# Patient Record
Sex: MALE | Race: WHITE | NOT HISPANIC OR LATINO | Employment: FULL TIME | URBAN - METROPOLITAN AREA
[De-identification: names, ages, dates, MRNs, and addresses within clinical notes are randomized per-mention and may not be internally consistent; named-entity substitution may affect disease eponyms.]

---

## 2017-02-22 ENCOUNTER — OFFICE VISIT (OUTPATIENT)
Dept: FAMILY MEDICINE | Facility: CLINIC | Age: 42
End: 2017-02-22
Payer: COMMERCIAL

## 2017-02-22 VITALS
SYSTOLIC BLOOD PRESSURE: 120 MMHG | WEIGHT: 122.44 LBS | HEART RATE: 58 BPM | HEIGHT: 66 IN | BODY MASS INDEX: 19.68 KG/M2 | DIASTOLIC BLOOD PRESSURE: 78 MMHG | OXYGEN SATURATION: 99 % | TEMPERATURE: 98 F

## 2017-02-22 DIAGNOSIS — Z00.00 ROUTINE PHYSICAL EXAMINATION: Primary | ICD-10-CM

## 2017-02-22 PROCEDURE — 99396 PREV VISIT EST AGE 40-64: CPT | Mod: S$GLB,,, | Performed by: FAMILY MEDICINE

## 2017-02-22 PROCEDURE — 99999 PR PBB SHADOW E&M-EST. PATIENT-LVL III: CPT | Mod: PBBFAC,,, | Performed by: FAMILY MEDICINE

## 2017-02-22 NOTE — PROGRESS NOTES
[facility]  Progress Note    SUBJECTIVE:     Chief Complaint   Patient presents with    Annual Exam       HPI   Rajinder Morris  is a 41 y.o. male here for annual exam. Patient has no other new complaints/problems at this time.      Review of patient's allergies indicates:  No Known Allergies    History reviewed. No pertinent past medical history.  Past Surgical History   Procedure Laterality Date    Leg surgery       right leg     History reviewed. No pertinent family history.  Social History   Substance Use Topics    Smoking status: Current Some Day Smoker     Types: Cigars    Smokeless tobacco: None    Alcohol use No        Review of Systems   Constitutional: Negative for chills, diaphoresis and fever.   HENT: Negative for congestion, ear pain and sore throat.    Eyes: Negative for photophobia and discharge.   Respiratory: Negative for cough, shortness of breath and wheezing.    Cardiovascular: Negative for chest pain and palpitations.   Gastrointestinal: Negative for abdominal pain, constipation, diarrhea, nausea and vomiting.   Genitourinary: Negative for dysuria and hematuria.   Musculoskeletal: Negative for back pain and myalgias.   Skin: Negative for itching and rash.   Neurological: Negative for dizziness, sensory change, focal weakness, weakness and headaches.   All other systems reviewed and are negative.    OBJECTIVE:     Vitals:    02/22/17 1237   BP: 120/78   Pulse: (!) 58   Temp: 98.4 °F (36.9 °C)     Body mass index is 19.77 kg/(m^2).    Physical Exam   Constitutional: He is oriented to person, place, and time and well-developed, well-nourished, and in no distress. No distress.   HENT:   Head: Normocephalic and atraumatic.   Right Ear: Tympanic membrane is not perforated, not erythematous and not bulging. No hemotympanum.   Left Ear: Tympanic membrane is not perforated, not erythematous and not bulging. No hemotympanum.   Mouth/Throat: Oropharynx is clear and moist. No oropharyngeal  exudate.   Eyes: Conjunctivae and EOM are normal. Pupils are equal, round, and reactive to light.   Neck: No thyromegaly present.   Cardiovascular: Normal rate, regular rhythm and normal heart sounds.  Exam reveals no gallop and no friction rub.    No murmur heard.  Pulmonary/Chest: Effort normal and breath sounds normal. No respiratory distress. He has no wheezes. He has no rales.   Abdominal: Soft. Bowel sounds are normal. He exhibits no distension. There is no tenderness. There is no rebound and no guarding.   Musculoskeletal: Normal range of motion. He exhibits no edema or tenderness.   Lymphadenopathy:     He has no cervical adenopathy.   Neurological: He is alert and oriented to person, place, and time.   Skin: Skin is warm. No rash noted. He is not diaphoretic. No erythema.       Old records were reviewed. Labs and/or images were independently reviewed.    ASSESSMENT     1. Routine physical examination        PLAN:     Routine physical examination  -     CBC auto differential; Future  -     Comprehensive metabolic panel; Future  -     Hemoglobin A1c; Future  -     Lipid panel; Future  -     T4, free; Future  -     TSH; Future  -     HIV-1 and HIV-2 antibodies; Future; Expected date: 2/22/17  -     RPR; Future; Expected date: 2/22/17  -     Hepatitis panel, acute; Future; Expected date: 2/22/17  -     We briefly discussed diet, exercise, and routine preventive exams. All questions and comments addressed.    RTC FADIA Zuniga MD  02/22/2017 1:10 PM

## 2017-02-22 NOTE — LETTER
February 22, 2017      Edel Gomez MD  [address]  [place] [address]           [facility]  [address] [facility] [place] [address]  Phone: [phone]  Fax: [phone]          Patient: Rajinder Morris   MR Number: [id number]   YOB: 1975   Date of Visit: 2/22/2017       Dear Dr. Edel Gomez:    Thank you for referring Rajinder Morris to me for evaluation. Attached you will find relevant portions of my assessment and plan of care.    If you have questions, please do not hesitate to call me. I look forward to following Rajinder Morris along with you.    Sincerely,    Victor Hugo Zuniga MD    Enclosure  CC:  No Recipients    If you would like to receive this communication electronically, please contact [email or web address] or [phone] to request more information on [facility] access.    For providers and/or their staff who would like to refer a patient to [facility], please contact us through our one-stop-shop provider referral line, [facility], at [phone].    If you feel you have received this communication in error or would no longer like to receive these types of communications, please e-mail [email or web address]

## 2017-02-25 ENCOUNTER — LAB VISIT (OUTPATIENT)
Dept: LAB | Facility: HOSPITAL | Age: 42
End: 2017-02-25
Attending: FAMILY MEDICINE
Payer: COMMERCIAL

## 2017-02-25 DIAGNOSIS — Z00.00 ROUTINE PHYSICAL EXAMINATION: ICD-10-CM

## 2017-02-25 LAB
ALBUMIN SERPL BCP-MCNC: 4.1 G/DL
ALP SERPL-CCNC: 56 U/L
ALT SERPL W/O P-5'-P-CCNC: 12 U/L
ANION GAP SERPL CALC-SCNC: 9 MMOL/L
AST SERPL-CCNC: 15 U/L
BASOPHILS # BLD AUTO: 0.01 K/UL
BASOPHILS NFR BLD: 0.1 %
BILIRUB SERPL-MCNC: 0.6 MG/DL
BUN SERPL-MCNC: 17 MG/DL
CALCIUM SERPL-MCNC: 9.8 MG/DL
CHLORIDE SERPL-SCNC: 107 MMOL/L
CHOLEST/HDLC SERPL: 4.6 {RATIO}
CO2 SERPL-SCNC: 24 MMOL/L
CREAT SERPL-MCNC: 1.1 MG/DL
DIFFERENTIAL METHOD: ABNORMAL
EOSINOPHIL # BLD AUTO: 0.3 K/UL
EOSINOPHIL NFR BLD: 2.9 %
ERYTHROCYTE [DISTWIDTH] IN BLOOD BY AUTOMATED COUNT: 13.8 %
EST. GFR  (AFRICAN AMERICAN): >60 ML/MIN/1.73 M^2
EST. GFR  (NON AFRICAN AMERICAN): >60 ML/MIN/1.73 M^2
GLUCOSE SERPL-MCNC: 133 MG/DL
HCT VFR BLD AUTO: 47.2 %
HDL/CHOLESTEROL RATIO: 21.8 %
HDLC SERPL-MCNC: 179 MG/DL
HDLC SERPL-MCNC: 39 MG/DL
HGB BLD-MCNC: 15.5 G/DL
LDLC SERPL CALC-MCNC: 130.6 MG/DL
LYMPHOCYTES # BLD AUTO: 3 K/UL
LYMPHOCYTES NFR BLD: 29.6 %
MCH RBC QN AUTO: 30.8 PG
MCHC RBC AUTO-ENTMCNC: 32.8 %
MCV RBC AUTO: 94 FL
MONOCYTES # BLD AUTO: 1 K/UL
MONOCYTES NFR BLD: 9.9 %
NEUTROPHILS # BLD AUTO: 5.7 K/UL
NEUTROPHILS NFR BLD: 56.9 %
NONHDLC SERPL-MCNC: 140 MG/DL
PLATELET # BLD AUTO: 406 K/UL
PMV BLD AUTO: 9.3 FL
POTASSIUM SERPL-SCNC: 4.9 MMOL/L
PROT SERPL-MCNC: 8 G/DL
RBC # BLD AUTO: 5.03 M/UL
SODIUM SERPL-SCNC: 140 MMOL/L
T4 FREE SERPL-MCNC: 1.09 NG/DL
TRIGL SERPL-MCNC: 47 MG/DL
TSH SERPL DL<=0.005 MIU/L-ACNC: 1.21 UIU/ML
WBC # BLD AUTO: 9.98 K/UL

## 2017-02-25 PROCEDURE — 80061 LIPID PANEL: CPT

## 2017-02-25 PROCEDURE — 83036 HEMOGLOBIN GLYCOSYLATED A1C: CPT

## 2017-02-25 PROCEDURE — 85025 COMPLETE CBC W/AUTO DIFF WBC: CPT

## 2017-02-25 PROCEDURE — 80053 COMPREHEN METABOLIC PANEL: CPT

## 2017-02-25 PROCEDURE — 86592 SYPHILIS TEST NON-TREP QUAL: CPT

## 2017-02-25 PROCEDURE — 86703 HIV-1/HIV-2 1 RESULT ANTBDY: CPT

## 2017-02-25 PROCEDURE — 84443 ASSAY THYROID STIM HORMONE: CPT

## 2017-02-25 PROCEDURE — 80074 ACUTE HEPATITIS PANEL: CPT

## 2017-02-25 PROCEDURE — 36415 COLL VENOUS BLD VENIPUNCTURE: CPT | Mod: PO

## 2017-02-25 PROCEDURE — 84439 ASSAY OF FREE THYROXINE: CPT

## 2017-02-27 LAB
ESTIMATED AVG GLUCOSE: 126 MG/DL
HAV IGM SERPL QL IA: NEGATIVE
HBA1C MFR BLD HPLC: 6 %
HBV CORE IGM SERPL QL IA: NEGATIVE
HBV SURFACE AG SERPL QL IA: NEGATIVE
HCV AB SERPL QL IA: NEGATIVE
HIV 1+2 AB+HIV1 P24 AG SERPL QL IA: NEGATIVE
RPR SER QL: NORMAL

## 2017-09-29 ENCOUNTER — OFFICE VISIT (OUTPATIENT)
Dept: PSYCHIATRY | Facility: CLINIC | Age: 42
End: 2017-09-29
Payer: COMMERCIAL

## 2017-09-29 DIAGNOSIS — F43.23 ADJUSTMENT DISORDER WITH MIXED ANXIETY AND DEPRESSED MOOD: Primary | ICD-10-CM

## 2017-09-29 PROCEDURE — 90791 PSYCH DIAGNOSTIC EVALUATION: CPT | Mod: S$GLB,,, | Performed by: SOCIAL WORKER

## 2017-09-29 PROCEDURE — 99999 PR PBB SHADOW E&M-EST. PATIENT-LVL II: CPT | Mod: PBBFAC,,, | Performed by: SOCIAL WORKER

## 2017-09-29 NOTE — PROGRESS NOTES
Psychiatry Initial Visit (PhD/LCSW)  Diagnostic Interview - CPT 41250    Date: 9/29/2017    Site: [facility]    Referral source: Dr. Stan MD    Clinical status of patient: Outpatient    Rajinder Morris, a 42 y.o. male, for initial evaluation visit.  Met with patient and father.    Chief complaint/reason for encounter: anxiety, behavior and interpersonal    History of present illness: he was referred by an MD from [facility] due to needing a psychological evaluation to determine his diagnosis and his issues, which seem like developemental and or anxiety and or adult autism spectrum disorder, no meds, is healthy, no drugs, no alcohol, smokes cigarettes a little, is , has two grown sons and lives with his father and his mother, employed by [employer] and likes it, could relate and converse with me well in the interview and father present also name is Hong Morris, phone; [phone]. He is medically healthy, worries a lot and has a nervous manner about his, and an evaluation has been ordered. Concerns for the patient are that he has anxiety attacks, worries, and does not have eye contact when talking with others, says things over and over at times, nervous, and thinks he has no problems, however family are concerned and what to know what is going on, his MD thinks he does not have ADHD.    Pain: 0    Symptoms:   · Mood: denied  · Anxiety: decreased memory, excessive anxiety/worry, restlessness/keyed up, irritability and muscle tension  · Substance abuse: denied  · Cognitive functioning: denied  · Health behaviors: noncontributory    Psychiatric history: currently under psychiatric care    Medical history: none    Family history of psychiatric illness: none    Social history (marriage, employment, etc.): works full time, , two grown sons, lives with parents, father and mother, ages 67 and 55.    Substance use:   Alcohol: none   Drugs: none   Tobacco: cigarettes    Caffeine: some    Current medications and drug reactions (include OTC, herbal): see medication list none    Strengths and liabilities: Strength: Patient accepts guidance/feedback, Strength: Patient is expressive/articulate., Strength: Patient is intelligent., Strength: Patient is motivated for change., Strength: Patient is physically healthy., Strength: Patient has positive support network., Strength: Patient has reasonable judgment., Strength: Patient is stable., Liability: Patient is defensive., Liability: Patient is dependent., Liability: Patient lacks social skills., Liability: Patient lacks coping skills.    Current Evaluation:     Mental Status Exam:  General Appearance:  unremarkable, age appropriate   Speech: normal tone, normal rate, normal pitch, normal volume      Level of Cooperation: cooperative      Thought Processes: normal and logical   Mood: anxious, irritable      Thought Content: normal, no suicidality, no homicidality, delusions, or paranoia   Affect: congruent and appropriate   Orientation: Oriented x3   Memory: recent >  intact, remote >  intact   Attention Span & Concentration: intact   Fund of General Knowledge: intact and appropriate to age and level of education   Abstract Reasoning: interpretation of similarities was concrete   Judgment & Insight: fair     Language  intact     Diagnostic Impression - Plan:       ICD-10-CM ICD-9-CM   1. Adjustment disorder with mixed anxiety and depressed mood F43.23 309.28       Plan:individual psychotherapy, Psychological Testing-personality assessment  and Psychological Testing-intellectual assessment, complete psychological evaluation.    Return to Clinic: as scheduled    Length of Service (minutes): 30   I will do follow up with them.

## 2021-07-27 ENCOUNTER — OFFICE VISIT (OUTPATIENT)
Dept: URGENT CARE | Facility: CLINIC | Age: 46
End: 2021-07-27
Payer: COMMERCIAL

## 2021-07-27 VITALS
RESPIRATION RATE: 18 BRPM | BODY MASS INDEX: 20.09 KG/M2 | SYSTOLIC BLOOD PRESSURE: 127 MMHG | TEMPERATURE: 101 F | DIASTOLIC BLOOD PRESSURE: 80 MMHG | WEIGHT: 125 LBS | HEIGHT: 66 IN | HEART RATE: 84 BPM | OXYGEN SATURATION: 96 %

## 2021-07-27 DIAGNOSIS — R05.9 COUGH: Primary | ICD-10-CM

## 2021-07-27 DIAGNOSIS — U07.1 COVID-19 VIRUS DETECTED: ICD-10-CM

## 2021-07-27 DIAGNOSIS — U07.1 COVID-19: ICD-10-CM

## 2021-07-27 LAB
CTP QC/QA: YES
SARS-COV-2 RDRP RESP QL NAA+PROBE: POSITIVE

## 2021-07-27 PROCEDURE — 1159F MED LIST DOCD IN RCRD: CPT | Mod: CPTII,S$GLB,, | Performed by: PHYSICIAN ASSISTANT

## 2021-07-27 PROCEDURE — 3008F BODY MASS INDEX DOCD: CPT | Mod: CPTII,S$GLB,, | Performed by: PHYSICIAN ASSISTANT

## 2021-07-27 PROCEDURE — 99203 OFFICE O/P NEW LOW 30 MIN: CPT | Mod: S$GLB,,, | Performed by: PHYSICIAN ASSISTANT

## 2021-07-27 PROCEDURE — 3008F PR BODY MASS INDEX (BMI) DOCUMENTED: ICD-10-PCS | Mod: CPTII,S$GLB,, | Performed by: PHYSICIAN ASSISTANT

## 2021-07-27 PROCEDURE — 1159F PR MEDICATION LIST DOCUMENTED IN MEDICAL RECORD: ICD-10-PCS | Mod: CPTII,S$GLB,, | Performed by: PHYSICIAN ASSISTANT

## 2021-07-27 PROCEDURE — 1126F AMNT PAIN NOTED NONE PRSNT: CPT | Mod: CPTII,S$GLB,, | Performed by: PHYSICIAN ASSISTANT

## 2021-07-27 PROCEDURE — U0002 COVID-19 LAB TEST NON-CDC: HCPCS | Mod: QW,S$GLB,, | Performed by: PHYSICIAN ASSISTANT

## 2021-07-27 PROCEDURE — 1126F PR PAIN SEVERITY QUANTIFIED, NO PAIN PRESENT: ICD-10-PCS | Mod: CPTII,S$GLB,, | Performed by: PHYSICIAN ASSISTANT

## 2021-07-27 PROCEDURE — U0002: ICD-10-PCS | Mod: QW,S$GLB,, | Performed by: PHYSICIAN ASSISTANT

## 2021-07-27 PROCEDURE — 99203 PR OFFICE/OUTPT VISIT, NEW, LEVL III, 30-44 MIN: ICD-10-PCS | Mod: S$GLB,,, | Performed by: PHYSICIAN ASSISTANT

## 2021-07-30 ENCOUNTER — NURSE TRIAGE (OUTPATIENT)
Dept: ADMINISTRATIVE | Facility: CLINIC | Age: 46
End: 2021-07-30

## 2025-05-19 ENCOUNTER — TELEPHONE (OUTPATIENT)
Dept: NEUROLOGY | Facility: CLINIC | Age: 50
End: 2025-05-19
Payer: MEDICAID

## 2025-05-19 DIAGNOSIS — F09 COGNITIVE DYSFUNCTION: ICD-10-CM

## 2025-05-19 DIAGNOSIS — F43.23 ADJUSTMENT DISORDER WITH MIXED ANXIETY AND DEPRESSED MOOD: Primary | ICD-10-CM

## 2025-05-19 DIAGNOSIS — G40.909 SEIZURE DISORDER: ICD-10-CM

## 2025-05-22 RX ORDER — MEMANTINE HYDROCHLORIDE 10 MG/1
10 TABLET ORAL 2 TIMES DAILY
Qty: 180 TABLET | Refills: 3 | Status: SHIPPED | OUTPATIENT
Start: 2025-05-22 | End: 2026-05-22

## 2025-05-22 RX ORDER — LEVETIRACETAM 500 MG/1
1500 TABLET ORAL 2 TIMES DAILY
Qty: 540 TABLET | Refills: 3 | Status: SHIPPED | OUTPATIENT
Start: 2025-05-22

## 2025-05-22 RX ORDER — RIVASTIGMINE 9.5 MG/24H
1 PATCH, EXTENDED RELEASE TRANSDERMAL DAILY
Qty: 90 PATCH | Refills: 3 | Status: SHIPPED | OUTPATIENT
Start: 2025-05-22 | End: 2026-05-22